# Patient Record
Sex: FEMALE | Race: WHITE | NOT HISPANIC OR LATINO | Employment: OTHER | URBAN - NONMETROPOLITAN AREA
[De-identification: names, ages, dates, MRNs, and addresses within clinical notes are randomized per-mention and may not be internally consistent; named-entity substitution may affect disease eponyms.]

---

## 2017-09-29 ENCOUNTER — OFFICE VISIT (OUTPATIENT)
Dept: OBSTETRICS AND GYNECOLOGY | Facility: CLINIC | Age: 72
End: 2017-09-29

## 2017-09-29 VITALS
HEART RATE: 74 BPM | DIASTOLIC BLOOD PRESSURE: 70 MMHG | OXYGEN SATURATION: 96 % | HEIGHT: 64 IN | WEIGHT: 164 LBS | SYSTOLIC BLOOD PRESSURE: 144 MMHG | BODY MASS INDEX: 28 KG/M2

## 2017-09-29 DIAGNOSIS — N95.2 VAGINAL ATROPHY: ICD-10-CM

## 2017-09-29 DIAGNOSIS — Z46.89 ENCOUNTER FOR PESSARY MAINTENANCE: Primary | ICD-10-CM

## 2017-09-29 DIAGNOSIS — N81.11 MIDLINE CYSTOCELE: ICD-10-CM

## 2017-09-29 PROCEDURE — 99213 OFFICE O/P EST LOW 20 MIN: CPT | Performed by: NURSE PRACTITIONER

## 2017-09-30 NOTE — PROGRESS NOTES
Subjective   Orly Renee Celis is a 72 y.o. female.     History of Present Illness   Pt presents for pessary maintenance. She notes she has had her pessary since April due to cystocele. Placed by Dr. Golden in [place]. She cannot remove on her own and has it cleaned monthly. She notes she had a hysterectomy due to endometriosis in 1976. Denies any discharge, odor, or pain with pessary.      The following portions of the patient's history were reviewed and updated as appropriate: allergies, current medications, past family history, past medical history, past social history, past surgical history and problem list.    Review of Systems   Constitutional: Negative.  Negative for chills and fever.   Respiratory: Negative.    Cardiovascular: Negative.    Genitourinary: Negative.  Negative for difficulty urinating, dyspareunia, dysuria, genital sores, pelvic pain, vaginal bleeding, vaginal discharge and vaginal pain.   Musculoskeletal: Positive for arthralgias, back pain and myalgias (fibromyalgia).        Lupus   Psychiatric/Behavioral: The patient is nervous/anxious.        Objective   Physical Exam   Constitutional: She is oriented to person, place, and time. She appears well-developed and well-nourished.   Cardiovascular: Normal rate, regular rhythm and normal heart sounds.    Pulmonary/Chest: Effort normal and breath sounds normal.   Genitourinary: There is no rash, tenderness or lesion on the right labia. There is no rash, tenderness or lesion on the left labia. No tenderness in the vagina. No vaginal discharge found.   Genitourinary Comments: Ring pessary with support removed, cleansed and replaced. Notes erythema near the vaginal cuff, mildly friable and atrophic. 1g Premarin cream inserted prior to placement of pessary.    Neurological: She is alert and oriented to person, place, and time.   Psychiatric: She has a normal mood and affect. Her behavior is normal.   Vitals reviewed.      Assessment/Plan   Orly was  seen today for pessary check.    Diagnoses and all orders for this visit:    Encounter for pessary maintenance  -     conjugated estrogens (PREMARIN) 0.625 MG/GM vaginal cream; Insert  into the vagina Daily.    Midline cystocele    Vaginal atrophy  -     conjugated estrogens (PREMARIN) 0.625 MG/GM vaginal cream; Insert  into the vagina Daily.     Discussed findings with pt. Recommend 1g Premarin cream into vagina weekly. Vulvar hygiene reviewed. RTC in 1 month for pessary maintenance.

## 2017-10-30 ENCOUNTER — OFFICE VISIT (OUTPATIENT)
Dept: OBSTETRICS AND GYNECOLOGY | Facility: CLINIC | Age: 72
End: 2017-10-30

## 2017-10-30 VITALS
HEART RATE: 98 BPM | SYSTOLIC BLOOD PRESSURE: 128 MMHG | BODY MASS INDEX: 28.07 KG/M2 | HEIGHT: 64 IN | RESPIRATION RATE: 16 BRPM | WEIGHT: 164.4 LBS | DIASTOLIC BLOOD PRESSURE: 74 MMHG

## 2017-10-30 DIAGNOSIS — N81.11 MIDLINE CYSTOCELE: ICD-10-CM

## 2017-10-30 DIAGNOSIS — N95.2 VAGINAL ATROPHY: ICD-10-CM

## 2017-10-30 DIAGNOSIS — Z46.89 PESSARY MAINTENANCE: Primary | ICD-10-CM

## 2017-10-30 PROCEDURE — 99213 OFFICE O/P EST LOW 20 MIN: CPT | Performed by: NURSE PRACTITIONER

## 2017-10-30 NOTE — PROGRESS NOTES
Subjective   Orly Renee Celis is a 72 y.o. female.     History of Present Illness   Pt presents for pessary cleaning. She cannot remove and insert on her own. She states the premarin cream has been messy. She does note vaginal discharge. Denies itching, odor, burning. She states last time I inserted it wasn't high enough. I instructed pt that she is welcome to move up PRN. She denies any other concerns today.     The following portions of the patient's history were reviewed and updated as appropriate: allergies, current medications, past family history, past medical history, past social history, past surgical history and problem list.    Review of Systems   Constitutional: Positive for fatigue (feeling ill). Negative for chills and fever.   Respiratory: Negative.    Cardiovascular: Negative.    Gastrointestinal: Positive for nausea (had stomach virus).   Genitourinary: Positive for vaginal discharge. Negative for dysuria, genital sores, pelvic pain, vaginal bleeding and vaginal pain.       Objective   Physical Exam   Constitutional: She is oriented to person, place, and time. She appears well-developed and well-nourished.   Cardiovascular: Normal rate, regular rhythm and normal heart sounds.    Pulmonary/Chest: Effort normal and breath sounds normal.   Genitourinary:   Genitourinary Comments: Pessary removed, cleansed. Tissue erythema at vaginal cuff improving but still present. 2g dose of premarin cream inserted, pessary replaced.     Neurological: She is alert and oriented to person, place, and time.   Psychiatric: She has a normal mood and affect. Her behavior is normal.   Vitals reviewed.      Assessment/Plan   Orly was seen today for follow-up and pessary cleaning.    Diagnoses and all orders for this visit:    Pessary maintenance    Midline cystocele    Vaginal atrophy     Continue 1g Premarin weekly. RTC in 6 weeks for continuing pessary maintenance, sooner PRN. Reassured that discharge is normal with the  pessary and premarin cream. S/S of infection discussed and encouraged pt to RTC if these occur.

## 2017-11-10 ENCOUNTER — OFFICE VISIT (OUTPATIENT)
Dept: OBSTETRICS AND GYNECOLOGY | Facility: CLINIC | Age: 72
End: 2017-11-10

## 2017-11-10 VITALS
WEIGHT: 165.8 LBS | HEIGHT: 64 IN | HEART RATE: 84 BPM | SYSTOLIC BLOOD PRESSURE: 142 MMHG | DIASTOLIC BLOOD PRESSURE: 80 MMHG | BODY MASS INDEX: 28.31 KG/M2 | RESPIRATION RATE: 16 BRPM

## 2017-11-10 DIAGNOSIS — Z46.89 FITTING AND ADJUSTMENT OF PESSARY: Primary | ICD-10-CM

## 2017-11-10 DIAGNOSIS — R10.2 VAGINAL PAIN: ICD-10-CM

## 2017-11-10 PROCEDURE — 99213 OFFICE O/P EST LOW 20 MIN: CPT | Performed by: NURSE PRACTITIONER

## 2017-11-10 RX ORDER — LIDOCAINE AND PRILOCAINE 25; 25 MG/G; MG/G
CREAM TOPICAL
COMMUNITY
Start: 2017-11-03 | End: 2018-06-11

## 2017-11-10 NOTE — PROGRESS NOTES
Subjective   Orly Celis is a 72 y.o. female.     History of Present Illness   Pt presents with concerns that her pessary has moved and creating discomfort and pain in the vagina. She notes she has been on her feet all day. Yesterday pt felt urinary frequency but this has resolved. Denies dysuria, suprapubic pain, frequency, urgency.     The following portions of the patient's history were reviewed and updated as appropriate: allergies, current medications, past family history, past medical history, past social history, past surgical history and problem list.    Review of Systems   Constitutional: Negative.  Negative for chills and fever.   Respiratory: Negative.    Cardiovascular: Negative.    Genitourinary: Positive for vaginal pain. Negative for difficulty urinating, dysuria, frequency, hematuria, pelvic pain, urgency, vaginal bleeding and vaginal discharge.       Objective   Physical Exam   Constitutional: She is oriented to person, place, and time. She appears well-developed and well-nourished.   Cardiovascular: Normal rate, regular rhythm and normal heart sounds.    Pulmonary/Chest: Effort normal and breath sounds normal.   Genitourinary:   Genitourinary Comments: Pessary appears lower in the vagina but is still in correct position. Pessary very difficult to remove due to narrow vaginal opening. Pt very uncomfortable with removal. Tissues do not appear irritated on speculum exam. Pessary cleaned and replaced. Pt states it feels better.    Neurological: She is alert and oriented to person, place, and time.   Vitals reviewed.      Assessment/Plan   Orly was seen today for pessary is causing pain.    Diagnoses and all orders for this visit:    Fitting and adjustment of pessary    Vaginal pain     Discussed normal findings with pt. She denies urinary tract infection symptoms and urinated before visit. Encouraged pt to monitor over the weekend and call Monday with an update. If still bothering pt, I  recommend decreasing size of pessary. Pt is in agreement to plan of care.

## 2017-11-29 ENCOUNTER — OFFICE VISIT (OUTPATIENT)
Dept: OBSTETRICS AND GYNECOLOGY | Facility: CLINIC | Age: 72
End: 2017-11-29

## 2017-11-29 VITALS
WEIGHT: 165.4 LBS | SYSTOLIC BLOOD PRESSURE: 158 MMHG | DIASTOLIC BLOOD PRESSURE: 80 MMHG | BODY MASS INDEX: 28.24 KG/M2 | RESPIRATION RATE: 16 BRPM | HEIGHT: 64 IN | TEMPERATURE: 98.5 F | HEART RATE: 97 BPM

## 2017-11-29 DIAGNOSIS — N39.45 CONTINUOUS LEAKAGE OF URINE: ICD-10-CM

## 2017-11-29 DIAGNOSIS — N30.91 CYSTITIS WITH HEMATURIA: Primary | ICD-10-CM

## 2017-11-29 LAB
BACTERIA UR QL AUTO: ABNORMAL /HPF
BILIRUB UR QL STRIP: NEGATIVE
CLARITY UR: ABNORMAL
COLOR UR: ABNORMAL
GLUCOSE UR STRIP-MCNC: ABNORMAL MG/DL
GRAN CASTS URNS QL MICRO: ABNORMAL /LPF
HGB UR QL STRIP.AUTO: ABNORMAL
HYALINE CASTS UR QL AUTO: ABNORMAL /LPF
KETONES UR QL STRIP: ABNORMAL
LEUKOCYTE ESTERASE UR QL STRIP.AUTO: ABNORMAL
NITRITE UR QL STRIP: POSITIVE
PH UR STRIP.AUTO: <=5 [PH] (ref 5.5–8)
PROT UR QL STRIP: ABNORMAL
RBC # UR: ABNORMAL /HPF
REF LAB TEST METHOD: ABNORMAL
SP GR UR STRIP: >=1.03 (ref 1–1.03)
SQUAMOUS #/AREA URNS HPF: ABNORMAL /HPF
TRANS CELLS #/AREA URNS HPF: ABNORMAL /HPF
UROBILINOGEN UR QL STRIP: ABNORMAL
WBC UR QL AUTO: ABNORMAL /HPF

## 2017-11-29 PROCEDURE — 81001 URINALYSIS AUTO W/SCOPE: CPT | Performed by: NURSE PRACTITIONER

## 2017-11-29 PROCEDURE — 87086 URINE CULTURE/COLONY COUNT: CPT | Performed by: NURSE PRACTITIONER

## 2017-11-29 PROCEDURE — 99213 OFFICE O/P EST LOW 20 MIN: CPT | Performed by: NURSE PRACTITIONER

## 2017-11-29 RX ORDER — AMOXICILLIN AND CLAVULANATE POTASSIUM 875; 125 MG/1; MG/1
1 TABLET, FILM COATED ORAL EVERY 12 HOURS
Qty: 14 TABLET | Refills: 0 | Status: SHIPPED | OUTPATIENT
Start: 2017-11-29 | End: 2017-12-06

## 2017-11-29 NOTE — PROGRESS NOTES
Subjective   Orly Renee Celis is a 72 y.o. female.     History of Present Illness   Pt presents with complaints of dysuria, malaise and fatigue. Also notes worsening urinary incontinence without urge or stress. Has had some incontinence but worsening in the last couple of weeks. She see's Dr. Bowen, urology, who placed her on Keflex one tablet at bedtime. She also has pessary in place. No current problems with pessary. Last UTI was a while ago according to pt but she has difficulty relaying timeline of events. Denies fever, nausea, vomiting. Positive back pain but no worse or different from usual back pain.     The following portions of the patient's history were reviewed and updated as appropriate: allergies, current medications, past family history, past medical history, past social history, past surgical history and problem list.    Review of Systems   Constitutional: Positive for fatigue. Negative for chills and fever.   Respiratory: Negative.    Cardiovascular: Negative.    Gastrointestinal: Negative.  Negative for nausea and vomiting.   Genitourinary: Positive for dysuria, enuresis, frequency and pelvic pain (suprapubic discomfort). Negative for decreased urine volume, difficulty urinating, flank pain, hematuria, urgency and vaginal pain.   Neurological: Positive for dizziness. Negative for syncope, light-headedness and headaches.       Objective   Physical Exam   Constitutional: She is oriented to person, place, and time. She appears well-developed and well-nourished. She has a sickly appearance.   Cardiovascular: Normal rate, regular rhythm and normal heart sounds.    Pulmonary/Chest: Effort normal and breath sounds normal.   Abdominal: There is tenderness in the suprapubic area. There is no CVA tenderness.   Neurological: She is alert and oriented to person, place, and time.   Vitals reviewed.        Brief Urine Lab Results  (Last result in the past 365 days)      Color   Clarity   Blood   Leuk Est    Nitrite   Protein   CREAT   Urine HCG        11/29/17 0908 Orange(A) Cloudy(A) Moderate (2+)(A) Large (3+)(A) Positive(A) 100 mg/dL (2+)(A)               Assessment/Plan   Orly was seen today for burning with urination and urine leakage.    Diagnoses and all orders for this visit:    Cystitis with hematuria  -     Urinalysis With / Culture If Indicated - Urine, Clean Catch  -     Urinalysis, Microscopic Only - Urine, Clean Catch  -     Urine Culture - Urine, Urine, Clean Catch  -     amoxicillin-clavulanate (AUGMENTIN) 875-125 MG per tablet; Take 1 tablet by mouth Every 12 (Twelve) Hours for 7 days.    Continuous leakage of urine     Pt has allergy to Macrobid and Bactrim interacts with Methotrexate (patient took this AM). Treat with Augmentin, one tablet PO x 7 days. Recheck urine at follow up appointment for pessary cleaning, or sooner PRN. Call with any concerns, ER after hours. Push fluids and rest. Encouraged pt to see urology again if leaking continues.

## 2017-12-01 LAB
BACTERIA SPEC AEROBE CULT: NORMAL
BACTERIA SPEC AEROBE CULT: NORMAL

## 2017-12-29 ENCOUNTER — OFFICE VISIT (OUTPATIENT)
Dept: OBSTETRICS AND GYNECOLOGY | Facility: CLINIC | Age: 72
End: 2017-12-29

## 2017-12-29 VITALS
SYSTOLIC BLOOD PRESSURE: 140 MMHG | TEMPERATURE: 98.1 F | HEIGHT: 64 IN | RESPIRATION RATE: 16 BRPM | WEIGHT: 166.6 LBS | BODY MASS INDEX: 28.44 KG/M2 | HEART RATE: 85 BPM | DIASTOLIC BLOOD PRESSURE: 78 MMHG

## 2017-12-29 DIAGNOSIS — N30.90 RECURRENT CYSTITIS: ICD-10-CM

## 2017-12-29 DIAGNOSIS — N76.0 VAGINAL INFLAMMATION FROM PESSARY, INITIAL ENCOUNTER (HCC): Primary | ICD-10-CM

## 2017-12-29 DIAGNOSIS — T83.69XA VAGINAL INFLAMMATION FROM PESSARY, INITIAL ENCOUNTER (HCC): Primary | ICD-10-CM

## 2017-12-29 LAB
BACTERIA UR QL AUTO: ABNORMAL /HPF
BILIRUB UR QL STRIP: NEGATIVE
CLARITY UR: CLEAR
COLOR UR: YELLOW
GLUCOSE UR STRIP-MCNC: NEGATIVE MG/DL
HGB UR QL STRIP.AUTO: ABNORMAL
HYALINE CASTS UR QL AUTO: ABNORMAL /LPF
KETONES UR QL STRIP: ABNORMAL
LEUKOCYTE ESTERASE UR QL STRIP.AUTO: ABNORMAL
NITRITE UR QL STRIP: NEGATIVE
PH UR STRIP.AUTO: <=5 [PH] (ref 5.5–8)
PROT UR QL STRIP: ABNORMAL
RBC # UR: ABNORMAL /HPF
REF LAB TEST METHOD: ABNORMAL
SP GR UR STRIP: 1.02 (ref 1–1.03)
SQUAMOUS #/AREA URNS HPF: ABNORMAL /HPF
TRANS CELLS #/AREA URNS HPF: ABNORMAL /HPF
UROBILINOGEN UR QL STRIP: ABNORMAL
WBC UR QL AUTO: ABNORMAL /HPF

## 2017-12-29 PROCEDURE — 81001 URINALYSIS AUTO W/SCOPE: CPT | Performed by: NURSE PRACTITIONER

## 2017-12-29 PROCEDURE — 99213 OFFICE O/P EST LOW 20 MIN: CPT | Performed by: NURSE PRACTITIONER

## 2017-12-29 PROCEDURE — 87086 URINE CULTURE/COLONY COUNT: CPT | Performed by: NURSE PRACTITIONER

## 2017-12-29 RX ORDER — CEPHALEXIN 500 MG/1
500 CAPSULE ORAL DAILY
COMMUNITY

## 2017-12-29 RX ORDER — LATANOPROST 50 UG/ML
SOLUTION/ DROPS OPHTHALMIC
Refills: 5 | COMMUNITY
Start: 2017-11-30

## 2017-12-31 LAB — BACTERIA SPEC AEROBE CULT: NORMAL

## 2018-01-02 NOTE — PROGRESS NOTES
Subjective   Orly Renee Celis is a 72 y.o. female.     History of Present Illness   Pt presents for pessary cleaning but has concerns about irritation and pain. She notes that in the last 2-3 months, the pessary is uncomfortable. Denies difficulty with bowel or bladder function. Denies S/S of UTI today. UA collected due to frequent UTIs. Culture negative. Pt has been using premarin cream twice weekly. Pt states when she was fitted for the pessary, the provider went up one size from what the patient felt comfortable due to urinary leaking. She believes reduction in size of pessary may relieve some discomfort.     The following portions of the patient's history were reviewed and updated as appropriate: allergies, current medications, past family history, past medical history, past social history, past surgical history and problem list.    Review of Systems   Constitutional: Negative.  Negative for chills and fever.   Respiratory: Negative.    Cardiovascular: Negative.    Genitourinary: Positive for vaginal discharge (mild and chronic with pessary use) and vaginal pain. Negative for difficulty urinating, dysuria, flank pain, frequency, hematuria, urgency and vaginal bleeding.   Neurological: Negative for dizziness, syncope and light-headedness.       Objective    Vitals:    12/29/17 1319   BP: 140/78   Pulse: 85   Resp: 16   Temp: 98.1 °F (36.7 °C)     Last 2 weights    12/29/17  1319   Weight: 75.6 kg (166 lb 9.6 oz)       Physical Exam   Constitutional: She is oriented to person, place, and time. She appears well-developed and well-nourished.   Cardiovascular: Normal rate, regular rhythm and normal heart sounds.    Pulmonary/Chest: Effort normal and breath sounds normal.   Genitourinary: There is no rash, tenderness or lesion on the right labia. There is no rash, tenderness or lesion on the left labia. There is erythema and tenderness in the vagina. No bleeding in the vagina. No foreign body in the vagina. No  vaginal discharge (scant) found.   Genitourinary Comments: Introitus is very small, making removal of #4 ring pessary with support difficult. Pt uncomfortable with removal. The lateral walls of the vaginal vault are erythematous and beginning to ulceration very mildly. Pessary cleaned and not reinserted   Neurological: She is alert and oriented to person, place, and time.   Psychiatric: She has a normal mood and affect. Her behavior is normal.   Vitals reviewed.    Brief Urine Lab Results  (Last result in the past 365 days)      Color   Clarity   Blood   Leuk Est   Nitrite   Protein   CREAT   Urine HCG        12/29/17 1309 Yellow Clear Trace(A) Small (1+)(A) Negative 30 mg/dL (1+)(A)             Culture showed no growth at 24 hours. Pt denies symptoms. Blood likely from pessary irritation     Assessment/Plan   Orly was seen today for follow-up.    Diagnoses and all orders for this visit:    Vaginal inflammation from pessary, initial encounter   Pessary was not replaced. I discussed findings with pt. Due to discomfort and irritation, I recommend leaving out for 2 weeks. Insert premarin cream three times per week during this time. I have ordered a #3 ring pessary with support. Pt to RTC in 2 weeks for refitting and insertion of the smaller pessary pending improvement of vaginal inflammation. Reviewed vulvar hygiene guidelines. Pt is in agreement with plan of care. All questions answered.     Recurrent cystitis  -     Urinalysis With / Culture If Indicated - Urine, Clean Catch  -     Urinalysis, Microscopic Only - Urine, Clean Catch  -     Urine Culture - Urine, Urine, Clean Catch

## 2018-01-10 ENCOUNTER — OFFICE VISIT (OUTPATIENT)
Dept: OBSTETRICS AND GYNECOLOGY | Facility: CLINIC | Age: 73
End: 2018-01-10

## 2018-01-10 VITALS
WEIGHT: 164.8 LBS | RESPIRATION RATE: 18 BRPM | DIASTOLIC BLOOD PRESSURE: 78 MMHG | BODY MASS INDEX: 28.13 KG/M2 | HEART RATE: 78 BPM | HEIGHT: 64 IN | SYSTOLIC BLOOD PRESSURE: 138 MMHG

## 2018-01-10 DIAGNOSIS — Z46.89 FITTING AND ADJUSTMENT OF PESSARY: Primary | ICD-10-CM

## 2018-01-10 PROCEDURE — 57160 INSERT PESSARY/OTHER DEVICE: CPT | Performed by: NURSE PRACTITIONER

## 2018-01-10 PROCEDURE — A4562 PESSARY, NON RUBBER,ANY TYPE: HCPCS | Performed by: NURSE PRACTITIONER

## 2018-01-11 NOTE — PROGRESS NOTES
Pessary insertion    Pt had previous vaginal irritation from pessary and chronic discomfort due to size of pessary. I remove on 12/29 and pt has left out for 1.5 weeks while used Premarin cream. Instructed to use 3x week, pt has been using twice daily. Pt has some difficulty with confusion. She stopped using 2 days ago. Today, she presents for reinsertion of a smaller pessary.     Date of procedure:  1/11/2018    Risks and benefits discussed? yes  All questions answered? yes  Consents given by the patient  Written consent obtained? No, verbal     Vaginal walls are light pink, no erythema or irritation. Well moisturized.     Pessary placed: Foldable ring w/ support - #3 w/o urethral bar.     Pt had no difficulty urinating with placement. Denied discomfort.              Post procedure instructions: Call ASAP if increasing pain or trouble passing urine or bowels    Follow up needed: 6 week(s) for pessary cleaning.    Diagnosis: Fitting and adjustment of pessary     This note was electronically signed.          This document has been electronically signed by LJ Torres on January 11, 2018 2:41 PM

## 2018-01-15 ENCOUNTER — OFFICE VISIT (OUTPATIENT)
Dept: OBSTETRICS AND GYNECOLOGY | Facility: CLINIC | Age: 73
End: 2018-01-15

## 2018-01-15 VITALS
DIASTOLIC BLOOD PRESSURE: 86 MMHG | RESPIRATION RATE: 16 BRPM | BODY MASS INDEX: 28 KG/M2 | SYSTOLIC BLOOD PRESSURE: 156 MMHG | HEIGHT: 64 IN | HEART RATE: 85 BPM | WEIGHT: 164 LBS

## 2018-01-15 DIAGNOSIS — T83.9XXA PROBLEM WITH VAGINAL PESSARY, INITIAL ENCOUNTER (HCC): Primary | ICD-10-CM

## 2018-01-15 PROCEDURE — 99212 OFFICE O/P EST SF 10 MIN: CPT | Performed by: NURSE PRACTITIONER

## 2018-01-17 NOTE — PROGRESS NOTES
"Subjective   Orly Renee Celis is a 72 y.o. female.     History of Present Illness  Pt called this morning stating her new, smaller pessary turned \"vertical\" and became very uncomfortable. Pt states it had moved a few times since placement last week. She believes she truly does need her previous, #4 pessary in order to prevent this from recurring. She was able to remove the smaller pessary on her own but unable to put the larger back in. Today she is here for placement of the #4 ring pessary with support.    The following portions of the patient's history were reviewed and updated as appropriate: allergies, current medications, past family history, past medical history, past social history, past surgical history and problem list.    Review of Systems   Constitutional: Negative.    Genitourinary: Positive for pelvic pain (without pessary), urgency (without pessary) and vaginal pain (secondary to pessary moving).       Objective    Vitals:    01/15/18 1049   BP: 156/86   Pulse: 85   Resp: 16     Last 2 weights    01/15/18  1049   Weight: 74.4 kg (164 lb)       Physical Exam   Constitutional: She is oriented to person, place, and time. She appears well-developed and well-nourished.   Genitourinary:   Genitourinary Comments: Tissues appear intake without erosion or erythema. #4 ring pessary with support placed in the vagina without complication   Neurological: She is alert and oriented to person, place, and time.   Vitals reviewed.      Assessment/Plan   Orly was seen today for pessary problem.    Diagnoses and all orders for this visit:    Problem with vaginal pessary, initial encounter       Pessary reinserted. All questions answered. RTC in 6 weeks for regularly scheduled cleaning.          "

## 2018-02-14 ENCOUNTER — OFFICE VISIT (OUTPATIENT)
Dept: OBSTETRICS AND GYNECOLOGY | Facility: CLINIC | Age: 73
End: 2018-02-14

## 2018-02-14 VITALS
SYSTOLIC BLOOD PRESSURE: 132 MMHG | DIASTOLIC BLOOD PRESSURE: 74 MMHG | HEART RATE: 77 BPM | WEIGHT: 166.4 LBS | HEIGHT: 64 IN | BODY MASS INDEX: 28.41 KG/M2

## 2018-02-14 DIAGNOSIS — T83.89XA VAGINAL IRRITATION FROM PESSARY (HCC): ICD-10-CM

## 2018-02-14 DIAGNOSIS — R10.2 SUPRAPUBIC PAIN: Primary | ICD-10-CM

## 2018-02-14 DIAGNOSIS — N89.8 VAGINAL IRRITATION FROM PESSARY (HCC): ICD-10-CM

## 2018-02-14 PROCEDURE — 99213 OFFICE O/P EST LOW 20 MIN: CPT | Performed by: NURSE PRACTITIONER

## 2018-02-15 NOTE — PROGRESS NOTES
"Subjective   Orly Renee Celis is a 72 y.o. female.     History of Present Illness  Pt presents with complaints of vaginal pain from pessary. She has had this pessary since April 2017 for cystocele. Had no problems until the Fall 2017. Since then she has had recurrence of pain on 10/30, 11/10, 12/29. 1/10/18 I refitted pt for a smaller pessary. Pt states it kept \"turning around\" and she removed on her own due to pain. She has difficulty describing the pain location and type. She keeps saying the pessary has turned but upon exam, it is found to be in the correct position. She went back to Dr. Golden recently to have it rechecked and pt states Dr. Golden didn't have any concerns with it at that time. She did have a UTI and was treated that day. Pt is very frustrated with her symptoms and wishes to have it removed today until she can get it back in to see Dr. Golden and consider surgical intervention.     The following portions of the patient's history were reviewed and updated as appropriate: allergies, current medications, past family history, past medical history, past social history, past surgical history and problem list.    Review of Systems   Constitutional: Negative.  Negative for chills and fever.   Respiratory: Negative.    Cardiovascular: Negative.    Gastrointestinal: Positive for constipation.   Genitourinary: Positive for pelvic pain, vaginal discharge and vaginal pain. Negative for decreased urine volume, difficulty urinating, dysuria, flank pain, frequency, hematuria, urgency and vaginal bleeding.   Neurological: Negative for dizziness, syncope, light-headedness and headaches.   Psychiatric/Behavioral: The patient is nervous/anxious.        Objective    Vitals:    02/14/18 1514   BP: 132/74   Pulse: 77     Last 2 weights    02/14/18  1514   Weight: 75.5 kg (166 lb 6.4 oz)     Body mass index is 28.56 kg/(m^2).    Physical Exam   Constitutional: She is oriented to person, place, and time. She appears " well-developed and well-nourished.   Cardiovascular: Normal rate, regular rhythm and normal heart sounds.    Pulmonary/Chest: Effort normal and breath sounds normal.   Abdominal: Soft. Normal appearance. There is no tenderness. There is no rigidity, no guarding and no CVA tenderness.   Genitourinary: There is no rash, tenderness or lesion on the right labia. There is no rash, tenderness or lesion on the left labia.   Genitourinary Comments: Pessary located in the correct position. The introitus is very narrow making removal difficult. Pt tolerated ok. Tissue does not appear to be irritated or ulcerated. Mild atrophy persists. 2g dose of premarin inserted. Pessary left out.    Neurological: She is alert and oriented to person, place, and time.   Psychiatric: Cognition and memory are impaired (mildly).   Frustrated,    Vitals reviewed.      Assessment/Plan   Orly was seen today for problems with pessary.    Diagnoses and all orders for this visit:    Suprapubic pain  -     Urinalysis With / Culture If Indicated - Urine, Clean Catch    Vaginal irritation from pessary       Pt to RTC for UA tomorrow as she cannot urinate today. I recommend pt follow up with Dr. Golden to explore other options. Pt is agreeable to this plan of care. All questions answered.

## 2018-06-11 PROCEDURE — 87077 CULTURE AEROBIC IDENTIFY: CPT | Performed by: PHYSICIAN ASSISTANT

## 2018-06-11 PROCEDURE — 87186 SC STD MICRODIL/AGAR DIL: CPT | Performed by: PHYSICIAN ASSISTANT

## 2018-06-11 PROCEDURE — 87086 URINE CULTURE/COLONY COUNT: CPT | Performed by: PHYSICIAN ASSISTANT

## 2019-06-10 ENCOUNTER — TRANSCRIBE ORDERS (OUTPATIENT)
Dept: PODIATRY | Facility: CLINIC | Age: 74
End: 2019-06-10

## 2019-06-10 DIAGNOSIS — L03.039 INFECTION OF TOENAIL: Primary | ICD-10-CM

## 2019-06-26 DIAGNOSIS — Z46.89 ENCOUNTER FOR PESSARY MAINTENANCE: ICD-10-CM

## 2019-06-26 DIAGNOSIS — N95.2 VAGINAL ATROPHY: ICD-10-CM

## 2019-06-26 RX ORDER — CONJUGATED ESTROGENS 0.62 MG/G
CREAM VAGINAL
Qty: 30 G | Refills: 1 | OUTPATIENT
Start: 2019-06-26

## 2019-06-26 RX ORDER — CONJUGATED ESTROGENS 0.62 MG/G
CREAM VAGINAL
Qty: 30 G | Refills: 1 | Status: SHIPPED | OUTPATIENT
Start: 2019-06-26

## 2019-08-08 ENCOUNTER — OFFICE VISIT (OUTPATIENT)
Dept: PODIATRY | Facility: CLINIC | Age: 74
End: 2019-08-08

## 2019-08-08 VITALS — WEIGHT: 169 LBS | HEIGHT: 63 IN | OXYGEN SATURATION: 97 % | BODY MASS INDEX: 29.95 KG/M2 | HEART RATE: 94 BPM

## 2019-08-08 DIAGNOSIS — B35.1 ONYCHOMYCOSIS: Primary | ICD-10-CM

## 2019-08-08 DIAGNOSIS — M79.674 PAIN OF TOE OF RIGHT FOOT: ICD-10-CM

## 2019-08-08 DIAGNOSIS — L60.1 ONYCHOLYSIS: ICD-10-CM

## 2019-08-08 PROCEDURE — 99203 OFFICE O/P NEW LOW 30 MIN: CPT | Performed by: PODIATRIST

## 2019-08-08 PROCEDURE — 11755 BIOPSY NAIL UNIT: CPT | Performed by: PODIATRIST

## 2019-08-29 ENCOUNTER — OFFICE VISIT (OUTPATIENT)
Dept: PODIATRY | Facility: CLINIC | Age: 74
End: 2019-08-29

## 2019-08-29 VITALS — OXYGEN SATURATION: 98 % | WEIGHT: 169 LBS | HEART RATE: 66 BPM | BODY MASS INDEX: 29.95 KG/M2 | HEIGHT: 63 IN

## 2019-08-29 DIAGNOSIS — S91.209D NAIL AVULSION, TOE, SUBSEQUENT ENCOUNTER: Primary | ICD-10-CM

## 2019-08-29 PROCEDURE — 99212 OFFICE O/P EST SF 10 MIN: CPT | Performed by: PODIATRIST

## 2020-11-13 DIAGNOSIS — Z46.89 ENCOUNTER FOR PESSARY MAINTENANCE: ICD-10-CM

## 2020-11-13 DIAGNOSIS — N95.2 VAGINAL ATROPHY: ICD-10-CM

## 2020-11-13 RX ORDER — CONJUGATED ESTROGENS 0.62 MG/G
CREAM VAGINAL
Qty: 30 G | Refills: 1 | OUTPATIENT
Start: 2020-11-13

## 2021-03-23 NOTE — PROGRESS NOTES
Orly Celis  1945  74 y.o. female      Patient presents to clinic possible nail fungus.   08/08/2019  Chief Complaint   Patient presents with   • Left Foot - Nail Problem   • Right Foot - Nail Problem           History of Present Illness    Orly Celis is a 74 y.o. female who presents for evaluation of bilateral toenail issue.  She notes chronic discoloration and mild thickening over the past several months.  There is some loosening to the right hallux nail as well.  She denies any acute pain associated with this.  She denies any associated redness or drainage.  She denies any known trauma or injuries.  She denies any previous treatments for this issue.  She is curious if this could be caused by fungus.    Past Medical History:   Diagnosis Date   • Acquired hypothyroidism    • Acute pharyngitis    • Acute sinusitis    • Allergic rhinitis    • Anxiety state    • Arthritis    • Backache    • Depressive disorder    • Disease of thyroid gland    • Disorder of lumbosacral intervertebral disc    • Fallen arches    • Family history of thyroid problem    • Fibrocystic disease of breast    • Fibromyalgia    • GERD (gastroesophageal reflux disease)    • Glaucoma    • Hyperlipidemia    • Impaired glucose tolerance    • Ingrown nail    • Ingrown toenail    • Lupus    • Lupus erythematosus    • Migraine    • On long term drug therapy    • Osteoarthritis    • Primary fibromyalgia syndrome    • Urinary tract infection    • Vitamin D deficiency          Past Surgical History:   Procedure Laterality Date   • CHOLECYSTECTOMY     • ENDOSCOPY  11/23/1977   • HEMORRHOIDECTOMY     • HYSTERECTOMY      w BSO   • INJECTION OF MEDICATION  09/28/2012    Depo Medrol (1)   • LUMBAR EPIDURAL INJECTION  03/06/2013   • OTHER SURGICAL HISTORY  03/21/2013    excision of nail and nail matrix         Family History   Problem Relation Age of Onset   • Cancer Other    • Diabetes Other    • Heart disease Other    • Osteoarthritis Other           Social History     Socioeconomic History   • Marital status:      Spouse name: Not on file   • Number of children: Not on file   • Years of education: Not on file   • Highest education level: Not on file   Tobacco Use   • Smoking status: Never Smoker   • Smokeless tobacco: Never Used   Substance and Sexual Activity   • Alcohol use: No   • Drug use: No   • Sexual activity: No         Current Outpatient Medications   Medication Sig Dispense Refill   • aspirin 81 MG EC tablet Take 81 mg by mouth Daily.     • Calcium Carb-Cholecalciferol (CALCIUM + D3 PO) Take 750 mg by mouth.     • cephalexin (KEFLEX) 500 MG capsule Take 500 mg by mouth Daily.     • clonazePAM (KlonoPIN) 0.5 MG tablet Take 0.5 mg by mouth 2 (Two) Times a Day As Needed for Seizures.     • DULoxetine (CYMBALTA) 30 MG capsule Take 30 mg by mouth Daily.     • fluticasone (FLONASE) 50 MCG/ACT nasal spray 1 spray into each nostril Daily.     • folic acid (FOLVITE) 1 MG tablet Take 1 mg by mouth Daily.     • hydroxychloroquine (PLAQUENIL) 200 MG tablet Take 200 mg by mouth 2 (Two) Times a Day.     • latanoprost (XALATAN) 0.005 % ophthalmic solution   5   • levothyroxine (SYNTHROID, LEVOTHROID) 137 MCG tablet Take 137 mcg by mouth Daily.     • methotrexate 2.5 MG tablet Take 2.5 mg by mouth 4 (Four) Times a Week.     • montelukast (SINGULAIR) 10 MG tablet Take 10 mg by mouth Every Night.     • omeprazole (PRILOSEC) 20 MG capsule Take 20 mg by mouth Daily.     • ondansetron ODT (ZOFRAN-ODT) 4 MG disintegrating tablet Take 1 tablet by mouth Every 6 (Six) Hours As Needed for Nausea or Vomiting. 12 tablet 0   • oxyCODONE-acetaminophen (PERCOCET)  MG per tablet Take 1 tablet by mouth Every 6 (Six) Hours As Needed for Moderate Pain (4-6).     • PREMARIN 0.625 MG/GM vaginal cream INSERT INTO THE VAGINA DAILY. 30 g 1   • simvastatin (ZOCOR) 40 MG tablet Take 40 mg by mouth Every Night.     • vitamin D (ERGOCALCIFEROL) 81937 UNITS capsule capsule  "Take 50,000 Units by mouth Every 14 (Fourteen) Days.       No current facility-administered medications for this visit.          OBJECTIVE    Pulse 94   Ht 160 cm (63\")   Wt 76.7 kg (169 lb)   LMP 09/01/1976 (Approximate)   SpO2 97%   BMI 29.94 kg/m²       Review of Systems   Constitutional: Positive for fatigue.   HENT: Positive for hearing loss.    Eyes:        Glaucoma    Respiratory: Negative.    Cardiovascular: Negative.    Gastrointestinal: Positive for nausea.   Endocrine: Negative.    Genitourinary: Negative.    Musculoskeletal: Positive for back pain.        Joint pain, foot pain, ankle pain, and arthritis    Skin: Negative.    Allergic/Immunologic: Negative.    Neurological: Negative.    Hematological: Negative.    Psychiatric/Behavioral: The patient is nervous/anxious.      Physical Exam   Constitutional: she appears well-developed and well-nourished.   HEENT: Normocephalic. Atraumatic.  CV: No CP. RRR  Resp: Non-labored respirations.  Psychiatric: she has a normal mood and affect. her behavior is normal.         Lower Extremity Exam:  Vascular: DP/PT pulses palpable 2+.   No edema  Foot warm  Neuro: Protective sensation intact, b/l.  DTRs intact  Negative Tinel over tarsal tunnel  Integument: No open wounds or lesions.  50% lateral onycholysis of right hallux nail with mild subungual debris. No SOI.  No erythema, scaling  No masses  Musculoskeletal: LE muscle strength 5/5.   Gait normal  Ankle ROM decreased without pain or crepitus  STJ ROM full without pain or crepitus  Mild, flexible hammertoes 2-4, b/l      Nail Removal  Date/Time: 8/13/2019 7:11 PM  Performed by: Atul Abraham DPM  Authorized by: Atul Abraham DPM   Consent: Written consent obtained.  Risks and benefits: risks, benefits and alternatives were discussed  Consent given by: patient  Location: right foot  Location details: right big toe  Anesthesia: digital block    Anesthesia:  Local Anesthetic: lidocaine 2% without " epinephrine  Anesthetic total: 3 mL  Preparation: skin prepped with Betadine  Amount removed: complete  Nail matrix removed: none  Removed nail replaced and anchored: no  Dressing: 4x4 and antibiotic ointment  Patient tolerance: Patient tolerated the procedure well with no immediate complications                  ASSESSMENT AND PLAN    Orly was seen today for nail problem and nail problem.    Diagnoses and all orders for this visit:    Onychomycosis    Onycholysis    Pain of toe of right foot      -Comprehensive foot and ankle exam performed  -Educated pt on diagnosis, etiology and treatment of onycholysis, possible onychomycosis  -Nail biopsy as above  -Recheck 2 weeks            This document has been electronically signed by Atul Abraham DPM on August 13, 2019 8:08 AM     EMR Dragon/Transcription disclaimer:   Much of this encounter note is an electronic transcription/translation of spoken language to printed text. The electronic translation of spoken language may permit erroneous, or at times, nonsensical words or phrases to be inadvertently transcribed; Although I have reviewed the note for such errors, some may still exist.    Atul Abraham DPM  8/13/2019  8:08 AM             Simple / Intermediate / Complex Repair - Final Wound Length In Cm: 3

## 2022-05-18 ENCOUNTER — TRANSCRIBE ORDERS (OUTPATIENT)
Dept: GENERAL RADIOLOGY | Facility: CLINIC | Age: 77
End: 2022-05-18

## 2022-05-18 DIAGNOSIS — N81.10 CYSTOCELE, UNSPECIFIED (CODE): Primary | ICD-10-CM
